# Patient Record
Sex: FEMALE | Race: OTHER | HISPANIC OR LATINO | URBAN - METROPOLITAN AREA
[De-identification: names, ages, dates, MRNs, and addresses within clinical notes are randomized per-mention and may not be internally consistent; named-entity substitution may affect disease eponyms.]

---

## 2023-03-23 ENCOUNTER — OFFICE (OUTPATIENT)
Dept: URBAN - METROPOLITAN AREA CLINIC 105 | Facility: CLINIC | Age: 88
End: 2023-03-23

## 2023-03-23 VITALS
OXYGEN SATURATION: 98 % | WEIGHT: 137 LBS | HEIGHT: 62 IN | SYSTOLIC BLOOD PRESSURE: 106 MMHG | DIASTOLIC BLOOD PRESSURE: 68 MMHG | HEART RATE: 75 BPM

## 2023-03-23 DIAGNOSIS — R10.9 UNSPECIFIED ABDOMINAL PAIN: ICD-10-CM

## 2023-03-23 DIAGNOSIS — K92.0 HEMATEMESIS: ICD-10-CM

## 2023-03-23 DIAGNOSIS — R14.0 ABDOMINAL DISTENSION (GASEOUS): ICD-10-CM

## 2023-03-23 DIAGNOSIS — K59.00 CONSTIPATION, UNSPECIFIED: ICD-10-CM

## 2023-03-23 DIAGNOSIS — K62.5 HEMORRHAGE OF ANUS AND RECTUM: ICD-10-CM

## 2023-03-23 DIAGNOSIS — K21.9 GASTRO-ESOPHAGEAL REFLUX DISEASE WITHOUT ESOPHAGITIS: ICD-10-CM

## 2023-03-23 PROCEDURE — 99204 OFFICE O/P NEW MOD 45 MIN: CPT

## 2023-03-23 RX ORDER — SODIUM SULFATE, MAGNESIUM SULFATE, AND POTASSIUM CHLORIDE 17.75; 2.7; 2.25 G/1; G/1; G/1
TABLET ORAL
Qty: 1 | Refills: 0 | Status: COMPLETED
Start: 2023-03-23 | End: 2023-08-22

## 2023-03-23 RX ORDER — OMEPRAZOLE 40 MG/1
CAPSULE, DELAYED RELEASE ORAL
Qty: 90 | Refills: 0 | Status: COMPLETED
End: 2023-03-23

## 2023-03-23 RX ORDER — PANTOPRAZOLE 40 MG/1
40 TABLET, DELAYED RELEASE ORAL
Qty: 90 | Refills: 1 | Status: ACTIVE
Start: 2023-03-23

## 2023-03-23 NOTE — SERVICEHPINOTES
Noemí Celestin   is seen for an initial visit today. 
br

br
Patient presents for evaluation of abdominal pain, blood in stool and hematemesis. Patient is accompanied by her family. Interview conducted in Slovak. Patient states that every time she eats she has pain in her stomach. Pain is primarily in her upper abdomen. She had small amount of hematemesis 1 week prior. Also had small amount of blood in stool. She reports having a colonoscopy 2 to 3 years ago. She went 3 days without a bowel movement. She states that Metamucil helps her with bowel movements. She takes a probiotic supplement. She takes Tylenol as needed for pain. She denies dysphagia. She denies family history of colon cancer.   Patient was recently seen in the ED 3/15/2023 for hematemesis and blood in stool. She had reported occasional abdominal pain.   She has a history of internal hemorrhoids. She has CT abdomen/pelvis which showed no acute intra-abdominal or pelvic findings, diverticulosis coli and mild dilation of the left renal collecting system likely representing a physiologic peristalsis. CBC normal. CMP with glucose 137, BUN 22, AST 41 and ALT 38. Patient was discharged home. She was advised to continue Metamucil and probiotic.

## 2023-03-23 NOTE — SERVICENOTES
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
– Deje de omeprazol y tome pantoprazol 40 mg al día, 30 a 60 minutos antes del desayuno
– [place] Metamucil a un suplemento de fibra diferente; prueba Fiber Choice, Citrucel o Benefiber
– [place] MiraLAX diariamente y ajuste la dosis para 1 o 2 deposiciones blandas al día
–Examen de jona ordenado

## 2023-04-07 ENCOUNTER — ON CAMPUS - OUTPATIENT (OUTPATIENT)
Dept: URBAN - METROPOLITAN AREA MEDICAL CENTER 8 | Facility: MEDICAL CENTER | Age: 88
End: 2023-04-07
Payer: MEDICARE

## 2023-04-07 DIAGNOSIS — K64.4 RESIDUAL HEMORRHOIDAL SKIN TAGS: ICD-10-CM

## 2023-04-07 DIAGNOSIS — K57.30 DIVERTICULOSIS OF LARGE INTESTINE WITHOUT PERFORATION OR ABS: ICD-10-CM

## 2023-04-07 DIAGNOSIS — R14.0 ABDOMINAL DISTENSION (GASEOUS): ICD-10-CM

## 2023-04-07 DIAGNOSIS — K31.89 OTHER DISEASES OF STOMACH AND DUODENUM: ICD-10-CM

## 2023-04-07 DIAGNOSIS — K92.1 MELENA: ICD-10-CM

## 2023-04-07 DIAGNOSIS — K22.89 OTHER SPECIFIED DISEASE OF ESOPHAGUS: ICD-10-CM

## 2023-04-07 DIAGNOSIS — K31.7 POLYP OF STOMACH AND DUODENUM: ICD-10-CM

## 2023-04-07 DIAGNOSIS — K64.8 OTHER HEMORRHOIDS: ICD-10-CM

## 2023-04-07 PROCEDURE — 43239 EGD BIOPSY SINGLE/MULTIPLE: CPT | Mod: 51

## 2023-04-07 PROCEDURE — 45378 DIAGNOSTIC COLONOSCOPY: CPT

## 2023-08-22 ENCOUNTER — OFFICE (OUTPATIENT)
Dept: URBAN - METROPOLITAN AREA CLINIC 105 | Facility: CLINIC | Age: 88
End: 2023-08-22

## 2023-08-22 VITALS
DIASTOLIC BLOOD PRESSURE: 70 MMHG | HEIGHT: 62 IN | SYSTOLIC BLOOD PRESSURE: 100 MMHG | OXYGEN SATURATION: 94 % | HEART RATE: 75 BPM | WEIGHT: 124 LBS

## 2023-08-22 DIAGNOSIS — I83.93 ASYMPTOMATIC VARICOSE VEINS OF BILATERAL LOWER EXTREMITIES: ICD-10-CM

## 2023-08-22 DIAGNOSIS — R10.9 UNSPECIFIED ABDOMINAL PAIN: ICD-10-CM

## 2023-08-22 DIAGNOSIS — R14.0 ABDOMINAL DISTENSION (GASEOUS): ICD-10-CM

## 2023-08-22 DIAGNOSIS — K58.0 IRRITABLE BOWEL SYNDROME WITH DIARRHEA: ICD-10-CM

## 2023-08-22 DIAGNOSIS — K63.89 OTHER SPECIFIED DISEASES OF INTESTINE: ICD-10-CM

## 2023-08-22 DIAGNOSIS — K21.9 GASTRO-ESOPHAGEAL REFLUX DISEASE WITHOUT ESOPHAGITIS: ICD-10-CM

## 2023-08-22 PROCEDURE — 99214 OFFICE O/P EST MOD 30 MIN: CPT

## 2023-08-22 RX ORDER — MAGNESIUM 250 MG
TABLET ORAL
Qty: 0 | Refills: 0 | Status: COMPLETED
End: 2023-08-22

## 2023-08-22 RX ORDER — RIFAXIMIN 550 MG/1
1650 TABLET ORAL
Qty: 42 | Refills: 2 | Status: ACTIVE
Start: 2023-08-22

## 2023-08-22 RX ORDER — PANTOPRAZOLE 40 MG/1
40 TABLET, DELAYED RELEASE ORAL
Qty: 90 | Refills: 1 | Status: ACTIVE
Start: 2023-03-23

## 2023-08-22 NOTE — SERVICENOTES
– Enviado en prescripción de rifaximina 550 mg 3 veces al día milena 14 días
– Suspender los suplementos de magnesio
– Reposición de pantoprazol 40 mg diarios, 30 a 60 minutos antes del desayuno
– Eros betsy balta con el Dr. Igor Brown–cirugía vascular para venas varicosas/hinchazón de piernas
– Seguimiento en 3 meses

## 2023-08-22 NOTE — SERVICEHPINOTES
Noemí Celestin   is seen today for a follow-up visit.     
ashly
brPatient presents for follow-up of hematemesis, abdominal pain, constipation, rectal bleeding, GERD and bloating. Patient initially seen in clinic 3/23/2023. At that time, patient had reported having pain with eating that is primarily in her upper abdomen. Also with a small amount of hematemesis 1 week prior. Also with some constipation for which she was taking Metamucil. She was also taking a probiotic. Patient was switched from omeprazole to pantoprazole 40 mg daily. She was advised to change her fiber supplement from Metamucil to Fiber Choice, Citrucel or Benefiber. She was advised to take MiraLAX daily and to adjust dose for 1-2 soft bowel movements daily. Thyroid studies normal.Patient had a EGD 4/2023 with irregular Z-line, multiple gastric polyps, mild antral erythema (bxs wnl, neg for H. pylori) and normal duodenum (bxs wnl, neg for celiac disease). She had a colonoscopy 4/2023 with sigmoid diverticulosis and internal/external hemorrhoids. She was advised to eat high-fiber diet, continue Benefiber for fiber supplement and take MiraLAX daily as needed.
ashly Ortega is accompanied by her sister. Interview conducted in Maltese. Patient states that every time she eats she has pain in her stomach and fecal urgency/diarrhea. She rarely has blood in stool. Also with associated gas/bloating. She tries to eat small meals. She occasionally has heartburn. She feels like her heartburn symptoms have improved with pantoprazole 40 mg daily.
ashly Ortega was evaluated by her cardiologist–Dr. Richardson for lower extremity edema and was found to have a normal echocardiogram. A referral was placed for vascular surgery.ashly

## 2024-03-04 ENCOUNTER — OFFICE (OUTPATIENT)
Dept: URBAN - METROPOLITAN AREA CLINIC 81 | Facility: CLINIC | Age: 89
End: 2024-03-04
Payer: MEDICARE

## 2024-03-04 VITALS
DIASTOLIC BLOOD PRESSURE: 70 MMHG | OXYGEN SATURATION: 96 % | WEIGHT: 123 LBS | HEART RATE: 71 BPM | HEIGHT: 62 IN | SYSTOLIC BLOOD PRESSURE: 110 MMHG

## 2024-03-04 DIAGNOSIS — R14.0 ABDOMINAL DISTENSION (GASEOUS): ICD-10-CM

## 2024-03-04 DIAGNOSIS — R10.84 GENERALIZED ABDOMINAL PAIN: ICD-10-CM

## 2024-03-04 DIAGNOSIS — K58.0 IRRITABLE BOWEL SYNDROME WITH DIARRHEA: ICD-10-CM

## 2024-03-04 DIAGNOSIS — R63.4 ABNORMAL WEIGHT LOSS: ICD-10-CM

## 2024-03-04 DIAGNOSIS — K63.89 OTHER SPECIFIED DISEASES OF INTESTINE: ICD-10-CM

## 2024-03-04 PROCEDURE — 99214 OFFICE O/P EST MOD 30 MIN: CPT | Performed by: SPECIALIST

## 2024-03-04 RX ORDER — RIFAXIMIN 550 MG/1
1650 TABLET ORAL
Qty: 42 | Refills: 2 | Status: ACTIVE
Start: 2023-08-22

## 2024-06-20 ENCOUNTER — APPOINTMENT (OUTPATIENT)
Dept: URBAN - METROPOLITAN AREA CLINIC 299 | Age: 89
Setting detail: DERMATOLOGY
End: 2024-06-21

## 2024-06-20 DIAGNOSIS — L21.8 OTHER SEBORRHEIC DERMATITIS: ICD-10-CM

## 2024-06-20 PROCEDURE — OTHER COUNSELING: OTHER

## 2024-06-20 PROCEDURE — OTHER PRESCRIPTION: OTHER

## 2024-06-20 PROCEDURE — 99204 OFFICE O/P NEW MOD 45 MIN: CPT

## 2024-06-20 RX ORDER — CLOBETASOL PROPIONATE 0.5 MG/ML
SOLUTION TOPICAL BID
Qty: 50 | Refills: 3 | Status: ERX | COMMUNITY
Start: 2024-06-20

## 2024-06-20 RX ORDER — KETOCONAZOLE 20 MG/ML
SHAMPOO, SUSPENSION TOPICAL BIW
Qty: 120 | Refills: 11 | Status: ERX | COMMUNITY
Start: 2024-06-20

## 2024-06-20 ASSESSMENT — LOCATION SIMPLE DESCRIPTION DERM
LOCATION SIMPLE: RIGHT SCALP
LOCATION SIMPLE: LEFT SCALP
LOCATION SIMPLE: RIGHT FOREHEAD

## 2024-06-20 ASSESSMENT — LOCATION DETAILED DESCRIPTION DERM
LOCATION DETAILED: RIGHT MEDIAL FRONTAL SCALP
LOCATION DETAILED: LEFT LATERAL FRONTAL SCALP
LOCATION DETAILED: RIGHT SUPERIOR LATERAL FOREHEAD

## 2024-06-20 ASSESSMENT — LOCATION ZONE DERM
LOCATION ZONE: FACE
LOCATION ZONE: SCALP

## 2024-07-24 ENCOUNTER — APPOINTMENT (OUTPATIENT)
Dept: URBAN - METROPOLITAN AREA CLINIC 299 | Age: 89
Setting detail: DERMATOLOGY
End: 2024-07-29

## 2024-07-24 DIAGNOSIS — L21.8 OTHER SEBORRHEIC DERMATITIS: ICD-10-CM

## 2024-07-24 PROCEDURE — OTHER MIPS QUALITY: OTHER

## 2024-07-24 PROCEDURE — OTHER COUNSELING: OTHER

## 2024-07-24 PROCEDURE — OTHER PRESCRIPTION MEDICATION MANAGEMENT: OTHER

## 2024-07-24 PROCEDURE — 99214 OFFICE O/P EST MOD 30 MIN: CPT

## 2024-07-24 ASSESSMENT — LOCATION DETAILED DESCRIPTION DERM
LOCATION DETAILED: LEFT LATERAL FRONTAL SCALP
LOCATION DETAILED: RIGHT MEDIAL FRONTAL SCALP
LOCATION DETAILED: RIGHT SUPERIOR LATERAL FOREHEAD

## 2024-07-24 ASSESSMENT — LOCATION SIMPLE DESCRIPTION DERM
LOCATION SIMPLE: RIGHT FOREHEAD
LOCATION SIMPLE: LEFT SCALP
LOCATION SIMPLE: RIGHT SCALP

## 2024-07-24 ASSESSMENT — LOCATION ZONE DERM
LOCATION ZONE: SCALP
LOCATION ZONE: FACE

## 2024-07-24 NOTE — PROCEDURE: PRESCRIPTION MEDICATION MANAGEMENT
Detail Level: Zone
Render In Strict Bullet Format?: No
Continue Regimen: Ketoconazole shampoo x 1 mo than can discontinue
Discontinue Regimen: Clobetasol
Modify Regimen: Call if symptoms recur